# Patient Record
Sex: FEMALE | NOT HISPANIC OR LATINO
[De-identification: names, ages, dates, MRNs, and addresses within clinical notes are randomized per-mention and may not be internally consistent; named-entity substitution may affect disease eponyms.]

---

## 2024-11-07 ENCOUNTER — RX ONLY (OUTPATIENT)
Age: 59
Setting detail: RX ONLY
End: 2024-11-07

## 2024-11-07 RX ORDER — AMOXICILLIN AND CLAVULANATE POTASSIUM 875; 125 MG/1; MG/1
TABLET, FILM COATED ORAL
Qty: 14 | Refills: 0 | Status: ERX | COMMUNITY
Start: 2024-11-07

## 2024-11-12 ENCOUNTER — APPOINTMENT (RX ONLY)
Age: 59
Setting detail: DERMATOLOGY
End: 2024-11-12

## 2024-11-12 DIAGNOSIS — Z41.9 ENCOUNTER FOR PROCEDURE FOR PURPOSES OTHER THAN REMEDYING HEALTH STATE, UNSPECIFIED: ICD-10-CM

## 2024-11-12 PROCEDURE — ? BOTOX

## 2024-11-12 NOTE — PROCEDURE: BOTOX
Forehead Units: 0
Show Glabellar Units: Yes
Show Ucl Units: No
Consent: Written consent obtained. Risks include but not limited to lid/brow ptosis, bruising, swelling, diplopia, temporary effect, incomplete chemical denervation.
Incrementing Botox Units: By 0.5 Units
Reconstitution Date (Optional): 11/12/2024
Show Inventory Tab: Hide
Comments: SAMPLE
Post-Care Instructions: Patient instructed to not lie down for 4 hours and limit physical activity for 24 hours.
Forehead Units: 14
Glabellar Complex Units: 12
Expiration Date (Month Year): 09/2026
Price (Use Numbers Only, No Special Characters Or $): 0.00
Lot #: [id number]
Dilution (U/0.1 Cc): 4
Detail Level: Detailed

## 2024-11-14 ENCOUNTER — RX ONLY (OUTPATIENT)
Age: 59
Setting detail: RX ONLY
End: 2024-11-14

## 2024-11-14 RX ORDER — DOXYCYCLINE HYCLATE 100 MG/1
CAPSULE, GELATIN COATED ORAL
Qty: 14 | Refills: 0 | Status: ERX | COMMUNITY
Start: 2024-11-14

## 2024-11-20 ENCOUNTER — RX ONLY (OUTPATIENT)
Age: 59
Setting detail: RX ONLY
End: 2024-11-20

## 2024-11-20 RX ORDER — AZITHROMYCIN MONOHYDRATE 500 MG/1
TABLET, FILM COATED ORAL
Qty: 3 | Refills: 0 | Status: ERX | COMMUNITY
Start: 2024-11-20

## 2024-11-26 ENCOUNTER — RX ONLY (OUTPATIENT)
Age: 59
Setting detail: RX ONLY
End: 2024-11-26

## 2024-11-26 RX ORDER — AZITHROMYCIN MONOHYDRATE 500 MG/1
TABLET, FILM COATED ORAL
Qty: 3 | Refills: 0 | Status: ERX

## 2024-11-26 RX ORDER — FLUCONAZOLE 200 MG/1
TABLET ORAL
Qty: 1 | Refills: 1 | Status: ERX | COMMUNITY
Start: 2024-11-26

## 2024-12-11 ENCOUNTER — RX ONLY (RX ONLY)
Age: 59
End: 2024-12-11

## 2024-12-11 RX ORDER — EFINACONAZOLE 100 MG/ML
SOLUTION TOPICAL
Qty: 4 | Refills: 3 | Status: ERX | COMMUNITY
Start: 2024-12-11

## 2025-01-07 ENCOUNTER — RX ONLY (RX ONLY)
Age: 60
End: 2025-01-07

## 2025-01-07 RX ORDER — AMOXICILLIN AND CLAVULANATE POTASSIUM 500; 125 MG/1; 1/1
TABLET, FILM COATED ORAL
Qty: 21 | Refills: 1 | Status: ERX | COMMUNITY
Start: 2025-01-07